# Patient Record
Sex: MALE | Race: WHITE
[De-identification: names, ages, dates, MRNs, and addresses within clinical notes are randomized per-mention and may not be internally consistent; named-entity substitution may affect disease eponyms.]

---

## 2019-02-27 ENCOUNTER — HOSPITAL ENCOUNTER (OUTPATIENT)
Dept: HOSPITAL 62 - SC | Age: 54
Discharge: HOME | End: 2019-02-27
Attending: OPHTHALMOLOGY
Payer: OTHER GOVERNMENT

## 2019-02-27 DIAGNOSIS — H40.1131: ICD-10-CM

## 2019-02-27 DIAGNOSIS — F17.210: ICD-10-CM

## 2019-02-27 DIAGNOSIS — H25.11: Primary | ICD-10-CM

## 2019-02-27 DIAGNOSIS — Z79.899: ICD-10-CM

## 2019-02-27 DIAGNOSIS — G40.909: ICD-10-CM

## 2019-02-27 DIAGNOSIS — K21.9: ICD-10-CM

## 2019-02-27 PROCEDURE — V2632 POST CHMBR INTRAOCULAR LENS: HCPCS

## 2019-02-27 PROCEDURE — C1783 OCULAR IMP, AQUEOUS DRAIN DE: HCPCS

## 2019-02-27 PROCEDURE — 0191T: CPT

## 2019-02-27 PROCEDURE — 66984 XCAPSL CTRC RMVL W/O ECP: CPT

## 2019-02-27 RX ADMIN — CYCLOPENTOLATE HYDROCHLORIDE AND PHENYLEPHRINE HYDROCHLORIDE PRN DROP: 2; 10 SOLUTION/ DROPS OPHTHALMIC at 11:00

## 2019-02-27 RX ADMIN — TETRACAINE HYDROCHLORIDE PRN DROP: 25 LIQUID OPHTHALMIC at 11:31

## 2019-02-27 RX ADMIN — TETRACAINE HYDROCHLORIDE PRN DROP: 25 LIQUID OPHTHALMIC at 11:00

## 2019-02-27 RX ADMIN — BESIFLOXACIN PRN DROP: 6 SUSPENSION OPHTHALMIC at 11:00

## 2019-02-27 RX ADMIN — TROPICAMIDE PRN DROP: 10 SOLUTION/ DROPS OPHTHALMIC at 11:00

## 2019-02-27 RX ADMIN — TROPICAMIDE PRN DROP: 10 SOLUTION/ DROPS OPHTHALMIC at 11:22

## 2019-02-27 RX ADMIN — CYCLOPENTOLATE HYDROCHLORIDE AND PHENYLEPHRINE HYDROCHLORIDE PRN DROP: 2; 10 SOLUTION/ DROPS OPHTHALMIC at 11:22

## 2019-02-27 RX ADMIN — BESIFLOXACIN PRN DROP: 6 SUSPENSION OPHTHALMIC at 11:10

## 2019-02-27 RX ADMIN — BESIFLOXACIN PRN DROP: 6 SUSPENSION OPHTHALMIC at 11:50

## 2019-02-27 RX ADMIN — Medication ONE ML: at 11:45

## 2019-02-27 RX ADMIN — Medication ONE ML: at 11:37

## 2019-02-27 RX ADMIN — CYCLOPENTOLATE HYDROCHLORIDE AND PHENYLEPHRINE HYDROCHLORIDE PRN DROP: 2; 10 SOLUTION/ DROPS OPHTHALMIC at 11:10

## 2019-02-27 RX ADMIN — TETRACAINE HYDROCHLORIDE PRN DROP: 25 LIQUID OPHTHALMIC at 11:22

## 2019-02-27 RX ADMIN — TROPICAMIDE PRN DROP: 10 SOLUTION/ DROPS OPHTHALMIC at 11:10

## 2019-03-13 ENCOUNTER — HOSPITAL ENCOUNTER (OUTPATIENT)
Dept: HOSPITAL 62 - SC | Age: 54
Discharge: HOME | End: 2019-03-13
Attending: OPHTHALMOLOGY
Payer: COMMERCIAL

## 2019-03-13 DIAGNOSIS — Z98.41: ICD-10-CM

## 2019-03-13 DIAGNOSIS — H40.1131: ICD-10-CM

## 2019-03-13 DIAGNOSIS — K21.9: ICD-10-CM

## 2019-03-13 DIAGNOSIS — Z79.899: ICD-10-CM

## 2019-03-13 DIAGNOSIS — J44.9: ICD-10-CM

## 2019-03-13 DIAGNOSIS — G40.909: ICD-10-CM

## 2019-03-13 DIAGNOSIS — D64.9: ICD-10-CM

## 2019-03-13 DIAGNOSIS — H25.12: Primary | ICD-10-CM

## 2019-03-13 PROCEDURE — C1783 OCULAR IMP, AQUEOUS DRAIN DE: HCPCS

## 2019-03-13 PROCEDURE — V2632 POST CHMBR INTRAOCULAR LENS: HCPCS

## 2019-03-13 PROCEDURE — 66984 XCAPSL CTRC RMVL W/O ECP: CPT

## 2019-03-13 PROCEDURE — 0191T: CPT

## 2019-03-13 RX ADMIN — HEPARIN SODIUM ONE ML: 1000 INJECTION, SOLUTION INTRAVENOUS; SUBCUTANEOUS at 08:49

## 2019-03-13 RX ADMIN — TOBRAMYCIN AND DEXAMETHASONE ONE APPLIC: 3; 1 OINTMENT OPHTHALMIC at 09:03

## 2019-03-13 RX ADMIN — SODIUM CHONDROITIN SULFATE / SODIUM HYALURONATE ONE EACH: 0.55-0.5 INJECTION INTRAOCULAR at 00:00

## 2019-03-13 RX ADMIN — EPINEPHRINE ONE MG: 1 INJECTION, SOLUTION, CONCENTRATE INTRAVENOUS at 00:00

## 2019-03-13 RX ADMIN — SODIUM CHONDROITIN SULFATE / SODIUM HYALURONATE ONE EACH: 0.55-0.5 INJECTION INTRAOCULAR at 08:49

## 2019-03-13 RX ADMIN — TROPICAMIDE PRN DROP: 10 SOLUTION/ DROPS OPHTHALMIC at 08:22

## 2019-03-13 RX ADMIN — EPINEPHRINE ONE MG: 1 INJECTION, SOLUTION, CONCENTRATE INTRAVENOUS at 08:49

## 2019-03-13 RX ADMIN — CYCLOPENTOLATE HYDROCHLORIDE AND PHENYLEPHRINE HYDROCHLORIDE PRN DROP: 2; 10 SOLUTION/ DROPS OPHTHALMIC at 08:22

## 2019-03-13 RX ADMIN — TROPICAMIDE PRN DROP: 10 SOLUTION/ DROPS OPHTHALMIC at 08:11

## 2019-03-13 RX ADMIN — TROPICAMIDE PRN DROP: 10 SOLUTION/ DROPS OPHTHALMIC at 08:01

## 2019-03-13 RX ADMIN — TETRACAINE HYDROCHLORIDE PRN DROP: 25 LIQUID OPHTHALMIC at 08:22

## 2019-03-13 RX ADMIN — CYCLOPENTOLATE HYDROCHLORIDE AND PHENYLEPHRINE HYDROCHLORIDE PRN DROP: 2; 10 SOLUTION/ DROPS OPHTHALMIC at 08:01

## 2019-03-13 RX ADMIN — TETRACAINE HYDROCHLORIDE PRN DROP: 25 LIQUID OPHTHALMIC at 08:36

## 2019-03-13 RX ADMIN — HEPARIN SODIUM ONE ML: 1000 INJECTION, SOLUTION INTRAVENOUS; SUBCUTANEOUS at 00:00

## 2019-03-13 RX ADMIN — CYCLOPENTOLATE HYDROCHLORIDE AND PHENYLEPHRINE HYDROCHLORIDE PRN DROP: 2; 10 SOLUTION/ DROPS OPHTHALMIC at 08:11

## 2019-03-13 RX ADMIN — BESIFLOXACIN PRN DROP: 6 SUSPENSION OPHTHALMIC at 08:16

## 2019-03-13 RX ADMIN — DORZOLAMIDE HYDROCHLORIDE AND TIMOLOL MALEATE PRN DROP: 20; 5 SOLUTION OPHTHALMIC at 09:03

## 2019-03-13 RX ADMIN — DORZOLAMIDE HYDROCHLORIDE AND TIMOLOL MALEATE PRN DROP: 20; 5 SOLUTION OPHTHALMIC at 00:00

## 2019-03-13 RX ADMIN — BESIFLOXACIN PRN DROP: 6 SUSPENSION OPHTHALMIC at 08:01

## 2019-03-13 RX ADMIN — BESIFLOXACIN PRN DROP: 6 SUSPENSION OPHTHALMIC at 09:03

## 2019-03-13 RX ADMIN — TETRACAINE HYDROCHLORIDE PRN DROP: 25 LIQUID OPHTHALMIC at 08:00

## 2019-03-13 RX ADMIN — TOBRAMYCIN AND DEXAMETHASONE ONE APPLIC: 3; 1 OINTMENT OPHTHALMIC at 00:00

## 2019-03-13 RX ADMIN — BESIFLOXACIN PRN DROP: 6 SUSPENSION OPHTHALMIC at 00:00

## 2019-10-28 ENCOUNTER — HOSPITAL ENCOUNTER (OUTPATIENT)
Dept: HOSPITAL 62 - END | Age: 54
Discharge: HOME | End: 2019-10-28
Attending: INTERNAL MEDICINE
Payer: OTHER GOVERNMENT

## 2019-10-28 VITALS — SYSTOLIC BLOOD PRESSURE: 130 MMHG | DIASTOLIC BLOOD PRESSURE: 78 MMHG

## 2019-10-28 DIAGNOSIS — Z87.19: ICD-10-CM

## 2019-10-28 DIAGNOSIS — K64.8: ICD-10-CM

## 2019-10-28 DIAGNOSIS — K51.90: Primary | ICD-10-CM

## 2019-10-28 DIAGNOSIS — Z09: ICD-10-CM

## 2019-10-28 DIAGNOSIS — K29.50: ICD-10-CM

## 2019-10-28 DIAGNOSIS — D12.5: ICD-10-CM

## 2019-10-28 PROCEDURE — 45380 COLONOSCOPY AND BIOPSY: CPT

## 2019-10-28 PROCEDURE — 43239 EGD BIOPSY SINGLE/MULTIPLE: CPT

## 2019-10-28 PROCEDURE — 00813 ANES UPR LWR GI NDSC PX: CPT

## 2019-10-28 PROCEDURE — 88305 TISSUE EXAM BY PATHOLOGIST: CPT

## 2019-10-28 PROCEDURE — 45385 COLONOSCOPY W/LESION REMOVAL: CPT

## 2019-10-28 NOTE — OPERATIVE REPORT
Operative Report


DATE OF SURGERY: 10/28/19


Operative Report: 





The risks, benefits and alternatives of the procedure including the risk of 

bleeding, perforation requiring surgery have been explained to the patient in 

detail and informed consent has been obtained.  Patient is taken back to the 

endoscopy suite and placed in the left, lateral decubital position.  Timeout was

called.  Propofol medication is administered.  Rectal examination is done which 

did not reveal any masses, tears or fissures.  An Olympus videoscope was 

introduced into the patient's rectum.  Scope was then carefully advanced all the

way to the cecum.  Cecum was identified by the usual anatomical landmarks of the

ileocecal valve as well as the appendiceal office.  Photodocumentation is 

obtained.  Scope was then sequentially pulled back via the various segments of 

the colon including the ascending colon, hepatic flexure, transverse colon, spl

enic flexure, descending colon finding to the rectosigmoid portions of the 

colon.  Retroflexion maneuvers performed.


The risks benefits and alternatives of the procedure explained to the patient in

detail and informed consent is obtained.A GIF Olympus video scope was inserted 

into the patient's mouth and hypopharynx, the esophagus is identified intubated 

and insufflated, the scope was then advanced through the esophagus stomach and 

duodenum, retroflexion maneuver is done, the esophagus stomach and first and 

second portions of the duodenum examined.


PREOPERATIVE DIAGNOSIS: colon Surveillance for personal history of ulcerative 

colitis.  Nausea vomiting


POSTOPERATIVE DIAGNOSIS: Esophagitis versus Segal's status post biopsy.  

Gastritis status post biopsy.  Colonic inflammation appears to be in remission 

random biopsies obtained.  Sigmoid polyp status post removal with snare 

polypectomy.  Internal hemorrhoids


OPERATION: Colonoscopy with snare polypectomy.  Colonoscopy with biopsy.  EGD 

with biopsy


SURGEON: TENA SEGOVIA


ANESTHESIA: LMAC


TISSUE REMOVED OR ALTERED: As noted above.


COMPLICATIONS: 





None.


ESTIMATED BLOOD LOSS: None.


INTRAOPERATIVE FINDINGS: As noted above.


PROCEDURE: 





Patient tolerated the procedure well.


No immediate postprocedure complications are noted.


Patient is discharged in good condition.


Discharge date 10/28/2019.


Discharge diet: Regular.


Discharge activity: Regular.


2 to 3-week follow-up to discuss findings.


Patient is instructed to call the office or proceed to the emergency room should

there be any further problems or questions.


Wait on the pathology.